# Patient Record
Sex: MALE | Race: WHITE
[De-identification: names, ages, dates, MRNs, and addresses within clinical notes are randomized per-mention and may not be internally consistent; named-entity substitution may affect disease eponyms.]

---

## 2018-06-21 ENCOUNTER — HOSPITAL ENCOUNTER (EMERGENCY)
Dept: HOSPITAL 31 - C.ER | Age: 38
Discharge: HOME | End: 2018-06-21
Payer: SELF-PAY

## 2018-06-21 VITALS
HEART RATE: 96 BPM | TEMPERATURE: 99.3 F | SYSTOLIC BLOOD PRESSURE: 146 MMHG | DIASTOLIC BLOOD PRESSURE: 85 MMHG | RESPIRATION RATE: 16 BRPM

## 2018-06-21 VITALS — BODY MASS INDEX: 27.8 KG/M2

## 2018-06-21 VITALS — OXYGEN SATURATION: 96 %
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
## 2018-06-21 DIAGNOSIS — S92.425A: Primary | ICD-10-CM

## 2018-06-21 NOTE — RAD
PROCEDURE:  Radiographs of the left great toe.



TECHNIQUE::  AP radiograph of the left foot, with oblique and lateral 

view of the left great toe.



COMPARISON:

None.



FINDINGS:



BONES:

There is a comminuted intra-articular fracture distal phalanx left 

great toe with mild surrounding soft tissue swelling. 



JOINTS:

Minimal hallux valgus deformity with slight degenerative 

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 



SOFT TISSUES:

As above. Minor vascular calcifications. 



OTHER FINDINGS:

None.



IMPRESSION:

There is a comminuted intra-articular fracture distal phalanx left 

great toe with mild surrounding soft tissue swelling.

## 2018-06-21 NOTE — CP.PCM.CON
History of Present Illness





- History of Present Illness


History of Present Illness: 


Podiatry Consult note for Dr. Osuna





37 year old male was seen at bedside this morning complain of left big toe 

pain.  He states that earlier today he dropped a TV on his left big toe.  At 

the time it didnt hurt, but has since gotten swollen and more painful.  Admits 

to using an ointment that helps swelling and naproxen with no relief.  He 

admits that he drinks alcohol on a daily basis. He currently denies any n/v/f/c/

sob/cp.





Past Patient History





- Past Social History


Smoking Status: Never Smoked





- CARDIAC


Hx Hypercholesterolemia: Yes


Hx Hypertension: Yes





- ENDOCRINE/METABOLIC


Hx Diabetes Mellitus Type 2: Yes





- PSYCHIATRIC


Hx Substance Use: No





Meds


Home Medications: 


 Home Medication List











 Medication  Instructions  Recorded  Confirmed  Type


 


Ibuprofen [Motrin] 600 mg PO Q8 #30 tab 06/21/18  Rx


 


traMADol [Ultram] 50 mg PO Q8 #20 tab 06/21/18  Rx











Allergies/Adverse Reactions: 


 Allergies











Allergy/AdvReac Type Severity Reaction Status Date / Time


 


No Known Allergies Allergy   Verified 09/07/17 08:48














Physical Exam





- Constitutional


Appears: Well, Non-toxic, No Acute Distress





- Extremities Exam


Additional comments: 


lower extremity focused exam:





Vasc:DP and PT pulses palpable 2/4 b/l. CFT < 3 seconds to all digits.  Skin 

temperature warm to warm from proximal to distal. Non-pitting edema noted to 

left great toe.





Derm:Ecchymosis noted to left hallux. No open lesions noted.





Ortho:Tenderness on palpation to left great toe.





Neuro: gross sensation intact b/l





- Neurological Exam


Neurological exam: Alert, Oriented x3





- Psychiatric Exam


Psychiatric exam: Normal Affect, Normal Mood





Results





- Vital Signs


Recent Vital Signs: 


 Last Vital Signs











Temp  98.6 F   06/21/18 02:17


 


Pulse  98 H  06/21/18 02:17


 


Resp  20   06/21/18 02:17


 


BP  138/96 H  06/21/18 02:17


 


Pulse Ox  96   06/21/18 03:33














Assessment & Plan





- Assessment and Plan (Free Text)


Assessment: 


37 year old male with left hallux non-displaced fx


Plan: 


patient examined and evaluated


discussed in detail with attending, Dr. Osuna


chart and vitals reviewed


radiograph reviewed- non-displaced fracture noted to the distal phalanx of left 

hallux


RICE therapy


patients left foot wrapped with ACE


surgical shoe dispensed, patient to wear at all times when ambulating, WBAT to 

heel


crutches given for support


pain medication per ED PA


discussed the importance of not drinking alcohol while taking any medications


patient to fu with Dr. Osuna in [facility] podiatry clinic on Monday

## 2018-07-31 ENCOUNTER — HOSPITAL ENCOUNTER (EMERGENCY)
Dept: HOSPITAL 31 - C.ER | Age: 38
Discharge: HOME | End: 2018-07-31
Payer: SELF-PAY

## 2018-07-31 VITALS
OXYGEN SATURATION: 96 % | TEMPERATURE: 98.8 F | SYSTOLIC BLOOD PRESSURE: 148 MMHG | RESPIRATION RATE: 18 BRPM | DIASTOLIC BLOOD PRESSURE: 98 MMHG | HEART RATE: 108 BPM

## 2018-07-31 VITALS — BODY MASS INDEX: 27.8 KG/M2

## 2018-07-31 DIAGNOSIS — S90.212D: Primary | ICD-10-CM
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
## 2018-07-31 NOTE — C.PDOC
History Of Present Illness


37 year old male presents to the ED c/o left great toe subungal hematoma. 

Patient was seen on 06/21 for left great toe fracture and subungal hematoma. 

Patient followed up once with podiatry and was told his toenail will eventually 

fall off, however toenail still attached. Patient denies new injury, fall, 

trauma, foot pain, drainage, weakness, numbness. 


Time Seen by Provider: 07/31/18 22:47


Chief Complaint (Nursing): Lower Extremity Problem/Injury


History Per: Patient


History/Exam Limitations: no limitations


Onset/Duration Of Symptoms: Days


Current Symptoms Are (Timing): Still Present


Recent travel outside of the [place]: No


Additional History Per: Patient





- Ankle/Foot


Description Of Injury: Other





Past Medical History


Reviewed: Historical Data, Nursing Documentation, Vital Signs


Vital Signs: 


 Last Vital Signs











Temp  98.8 F   07/31/18 22:37


 


Pulse  108 H  07/31/18 22:37


 


Resp  18   07/31/18 22:37


 


BP  148/98 H  07/31/18 22:37


 


Pulse Ox  96   07/31/18 23:51














- Medical History


PMH: HTN, Hypercholesterolemia


Surgical History: No Surg Hx


Family History: States: Unknown Family Hx





- Social History


Hx Alcohol Use: Yes


Hx Substance Use: No





- Immunization History


Hx Tetanus Toxoid Vaccination: No


Hx Influenza Vaccination: No


Hx Pneumococcal Vaccination: No





Review Of Systems


Constitutional: Negative for: Fever, Chills


Cardiovascular: Negative for: Chest Pain, Palpitations


Respiratory: Negative for: Cough, Shortness of Breath


Gastrointestinal: Negative for: Nausea, Vomiting, Abdominal Pain


Musculoskeletal: Positive for: Foot Pain


Neurological: Negative for: Weakness, Numbness





Physical Exam





- Physical Exam


Appears: Non-toxic, No Acute Distress


Skin: Normal Color, Warm, Dry


Head: Atraumatic, Normacephalic


Eye(s): bilateral: Normal Inspection


Extremity: Normal ROM, No Tenderness, Capillary Refill (< 2 seconds), No 

Swelling, Other (macular dry scaly rash to plantar aspect of left great toe 

consistent with psoriasis in other part of the body. No Pain, gross erythema, 

warmth, drainage of toenail.)


Pulses: Left Dorsalis Pedis: Normal, Right Dorsalis Pedis: Normal


Neurological/Psych: Oriented x3, Normal Speech, Normal Motor, Normal Sensation


Gait: Steady





ED Course And Treatment


O2 Sat by Pulse Oximetry: 96 (ON RA)


Pulse Ox Interpretation: Normal


Progress Note: Patient was advised to call to the clinic and get an appointment 

with podiatry to follow up.





Disposition





- Disposition


Referrals: 


[facility] [Outside]


Podiatry Clinic [Outside]


[facility] [Outside]


Disposition: HOME/ ROUTINE


Disposition Time: 22:59


Condition: STABLE


Additional Instructions: 


Please follow up with PODIATRY CLINIC- CALL FOR APPOINTMENT THIS WEEK 





CONTINUE WEARING ORTHO SHOE





RETURN TO ER IF WORSE 


Instructions:  Contusion (DC)


Forms:  [employer] Connect (English), Gen Discharge Inst [employer]


Print Language: Angolan





- Clinical Impression


Clinical Impression: 


 Subungual hematoma of great toe of left foot








- PA / NP / Resident Statement


MD/DO has reviewed & agrees with the documentation as recorded.





- Scribe Statement


The provider has reviewed the documentation as recorded by the Scribe


Ignacio Laura





All medical record entries made by the Scribe were at my direction and 

personally dictated by me. I have reviewed the chart and agree that the record 

accurately reflects my personal performance of the history, physical exam, 

medical decision making, and the department course for this patient. I have 

also personally directed, reviewed, and agree with the discharge instructions 

and disposition.